# Patient Record
(demographics unavailable — no encounter records)

---

## 2025-06-13 NOTE — HISTORY OF PRESENT ILLNESS
[de-identified] : Kady presents to the office status post left hip arthroscopy and labral repair about 2 months ago.  She feels she is progressing well with PT, she is still going 2 times per week.  She notes some discomfort with certain motions such as a straight leg raise.

## 2025-06-13 NOTE — DISCUSSION/SUMMARY
[de-identified] : Left hip status post labral repair 2 months ago.  She will continue with physical therapy, can gradually increase level of impact gradually.  Stretches reviewed with her today to do on her own at home.  She will follow-up in 6 weeks.

## 2025-06-13 NOTE — PHYSICAL EXAM
[de-identified] : Left Hip Exam No erythema, ecchymosis, or edema, no laceration or rash   Point tenderness - Greater trochanter - ASIS/AIIS - SIJ - Gluteal region   Range of motion Extension 0 Flexion 150 Internal rotation 50 External rotation 45   - FADIR  - GUNNAR    Neurovascularly intact distally